# Patient Record
Sex: MALE | Race: WHITE | NOT HISPANIC OR LATINO | Employment: OTHER | URBAN - METROPOLITAN AREA
[De-identification: names, ages, dates, MRNs, and addresses within clinical notes are randomized per-mention and may not be internally consistent; named-entity substitution may affect disease eponyms.]

---

## 2017-01-13 NOTE — TELEPHONE ENCOUNTER
----- Message from Amelia Velasquez sent at 1/13/2017  2:58 PM CST -----  Contact: Amelia, Pharmacy Patient Assistance  Didier Song,    I met with Mr. Hernandez's wife today and she wanted me to reach out to you for a refill on his Gabapentin 100mg.  Please forward to Dr. Mcconnell to advise.      Thanks,    Amelia

## 2017-01-13 NOTE — TELEPHONE ENCOUNTER
Pt was given gabapentin from Sherman Akhtar NP on 1/29/16. Pt wife is requesting a refill of it for pt, please advise LOV: 5/31/16

## 2017-01-14 RX ORDER — GABAPENTIN 300 MG/1
300 CAPSULE ORAL 3 TIMES DAILY
Qty: 270 CAPSULE | Refills: 3 | Status: SHIPPED | OUTPATIENT
Start: 2017-01-14 | End: 2017-06-01 | Stop reason: SDUPTHER

## 2017-01-27 ENCOUNTER — TELEPHONE (OUTPATIENT)
Dept: FAMILY MEDICINE | Facility: CLINIC | Age: 77
End: 2017-01-27

## 2017-01-27 NOTE — TELEPHONE ENCOUNTER
----- Message from Adilia Camp sent at 1/26/2017  2:04 PM CST -----  Contact: Vee;s wife  Patient was too weak to get in the car so they are unable to be here today. Please call Shamika with advice on how to get him in to see you. Call Shamika at [phone].

## 2017-02-08 ENCOUNTER — OFFICE VISIT (OUTPATIENT)
Dept: FAMILY MEDICINE | Facility: CLINIC | Age: 77
End: 2017-02-08
Payer: MEDICARE

## 2017-02-08 VITALS
HEART RATE: 84 BPM | HEIGHT: 73 IN | DIASTOLIC BLOOD PRESSURE: 66 MMHG | RESPIRATION RATE: 18 BRPM | SYSTOLIC BLOOD PRESSURE: 94 MMHG | OXYGEN SATURATION: 98 %

## 2017-02-08 DIAGNOSIS — B35.6 TINEA CRURIS: ICD-10-CM

## 2017-02-08 DIAGNOSIS — I69.359 CVA, OLD, HEMIPARESIS: Primary | ICD-10-CM

## 2017-02-08 PROCEDURE — 99213 OFFICE O/P EST LOW 20 MIN: CPT | Mod: PBBFAC,PO,25 | Performed by: FAMILY MEDICINE

## 2017-02-08 PROCEDURE — 99999 PR PBB SHADOW E&M-EST. PATIENT-LVL III: CPT | Mod: PBBFAC,,, | Performed by: FAMILY MEDICINE

## 2017-02-08 PROCEDURE — 90670 PCV13 VACCINE IM: CPT | Mod: PBBFAC,PO | Performed by: FAMILY MEDICINE

## 2017-02-08 PROCEDURE — 99214 OFFICE O/P EST MOD 30 MIN: CPT | Mod: S$PBB,,, | Performed by: FAMILY MEDICINE

## 2017-02-08 RX ORDER — CLOTRIMAZOLE AND BETAMETHASONE DIPROPIONATE 10; .5 MG/ML; MG/ML
LOTION TOPICAL 2 TIMES DAILY
Qty: 30 ML | Refills: 0 | Status: SHIPPED | OUTPATIENT
Start: 2017-02-08 | End: 2017-06-01

## 2017-02-08 NOTE — MR AVS SNAPSHOT
[facility]  [address]  [place] [address]  Phone: [phone]  Fax: [phone]                  Richie Hernandez   2017 3:00 PM   Office Visit    Description:  Male : 1940   Provider:  Henry Mcconnell MD   Department:  [facility]           Reason for Visit     Annual Exam           Diagnoses this Visit        Comments    CVA, old, hemiparesis    -  Primary     Tinea cruris                To Do List           Future Appointments        Provider Department Dept Phone    2017 1:45 PM Henry Mcconnell MD [facility] [phone]      Goals (5 Years of Data)     None       These Medications        Disp Refills Start End    clotrimazole-betamethasone (LOTRISONE) lotion 30 mL 0 2017     Apply topically 2 (two) times daily. - Topical (Top)    Pharmacy: [facility] - [place] - [address]  Ph #: [phone]         [facility]     [facility] -  Assistance  Registered nurses in the [facility] provide clinical advisement, health education, appointment booking, and other advisory services.  Call for this free service at [phone].             Medications           Message regarding Medications     Verify the changes and/or additions to your medication regime listed below are the same as discussed with your clinician today.  If any of these changes or additions are incorrect, please notify your healthcare provider.        START taking these NEW medications        Refills    clotrimazole-betamethasone (LOTRISONE) lotion 0    Sig: Apply topically 2 (two) times daily.    Class: Normal    Route: Topical (Top)      STOP taking these medications     atorvastatin (LIPITOR) 40 MG tablet Take 1 tablet (40 mg total) by mouth once daily.    bisacodyl (DULCOLAX) 5 mg EC tablet Take 1 tablet (5 mg total) by mouth daily as needed for Constipation.    docusate sodium (COLACE) 100 MG  "capsule Take 1 capsule (100 mg total) by mouth once daily.    miconazole NITRATE 2 % (MICOTIN) 2 % top powder Apply topically 2 (two) times daily.    pantoprazole (PROTONIX) 40 MG tablet Take 1 tablet (40 mg total) by mouth once daily.    polyethylene glycol (GLYCOLAX) 17 gram PwPk Take 17 g by mouth once daily.           Verify that the below list of medications is an accurate representation of the medications you are currently taking.  If none reported, the list may be blank. If incorrect, please contact your healthcare provider. Carry this list with you in case of emergency.           Current Medications     aspirin 81 MG Chew Take 1 tablet (81 mg total) by mouth once daily.    citalopram (CELEXA) 20 MG tablet Take 1 tablet (20 mg total) by mouth once daily.    gabapentin (NEURONTIN) 300 MG capsule Take 1 capsule (300 mg total) by mouth 3 (three) times daily.    levothyroxine (SYNTHROID) 200 MCG tablet Take 1 tablet (200 mcg total) by mouth once daily.    metoprolol tartrate (LOPRESSOR) 25 MG tablet Take 1 tablet (25 mg total) by mouth once daily.    rivaroxaban (XARELTO) 20 mg Tab Take 1 tablet (20 mg total) by mouth daily with dinner or evening meal.    clotrimazole-betamethasone (LOTRISONE) lotion Apply topically 2 (two) times daily.           Clinical Reference Information           Your Vitals Were     BP Pulse Resp Height SpO2       94/66 84 18 6' 1" (1.854 m) 98%       Blood Pressure          Most Recent Value    BP  94/66      Allergies as of 2/8/2017     No Known Allergies      Immunizations Administered on Date of Encounter - 2/8/2017     Name Date Dose VIS Date Route    Pneumococcal Conjugate - 13 Valent  Incomplete 0.5 mL 11/5/2015 Intramuscular      Orders Placed During Today's Visit      Normal Orders This Visit    Ambulatory referral to Home Health     Pneumococcal Conjugate Vaccine (13 Valent) (IM)       Smoking Cessation     If you would like to quit smoking:   You may be eligible for free " services if you are a [place] resident and started smoking cigarettes before September 1, 1988.  Call the [employer]oking [employer]essation [employer] (SCT) toll free at (105) 879-4152 or (685) 707-3526.   Call 1-800-QUIT-NOW if you do not meet the above criteria.            Language Assistance Services     ATTENTION: Language assistance services are available, free of charge. Please call [phone].      ATENCIÓN: Si habla aliviaañol, tiene a godinez disposición servicios gratuitos de asistencia lingüística. Llame al [phone].     CHÚ Ý: N?u b?n nói Ti?ng Vi?t, có các d?ch v? h? tr? ngôn ng? mi?n phí dành cho b?n. G?i s? [phone].         [facility] complies with applicable Federal civil rights laws and does not discriminate on the basis of race, color, national origin, age, disability, or sex.

## 2017-02-13 NOTE — TELEPHONE ENCOUNTER
----- Message from Amelia Velasquez sent at 2/13/2017  9:27 AM CST -----  Contact: Amelia, Pharmacy Patient Assistance  Hi,    I have been assisting pt with Xarelto and he has been approved to receive his medicine at no cost to him, however he is in need of a recent prescription to be sent to his local pharmacy for processing.  Will you have Dr. Mcconnell send Xarelto prescription to [facility] (pt's local pharmacy)?    Thanks,    Amelia ext [phone]

## 2017-02-14 NOTE — PROGRESS NOTES
Subjective:       Patient ID: Richie Hernandez is a 77 y.o. male.    Chief Complaint: Annual Exam (home health recertification)    HPI   Since our last visit, the patient has experienced a significant MCA stroke.    He is continuing to receive care for his right sided hemiparesis with expressive aphasia via home health.  He is in need of a face to face visit in order to continue this service.  He has had continued, slow improvement of these issues    Review of Systems    Objective:      Physical Exam   Constitutional: He appears well-developed and well-nourished.   HENT:   Head: Normocephalic and atraumatic.   Eyes: EOM are normal. Pupils are equal, round, and reactive to light.   Neck: Neck supple.   Cardiovascular: Normal rate, regular rhythm and normal heart sounds.  Exam reveals no gallop and no friction rub.    No murmur heard.  Pulmonary/Chest: Effort normal and breath sounds normal. He has no wheezes. He has no rales.   Neurological: He is alert. He exhibits abnormal muscle tone. Coordination (right sided hemiparesis) abnormal.   Skin: Rash (underlying the patient's right axilla and breast) noted.   Vitals reviewed.      Assessment:       1. CVA, old, hemiparesis    2. Tinea cruris        Plan:       CVA, old, hemiparesis  -     Ambulatory referral to Home Health  - Continue current therapy  - Serial blood pressure monitoring  - Diet and exercise education.  - Continue OT/PT/ST    Tinea cruris  -     clotrimazole-betamethasone (LOTRISONE) lotion; Apply topically 2 (two) times daily.  Dispense: 30 mL; Refill: 0    Other orders  -     Pneumococcal Conjugate Vaccine (13 Valent) (IM)

## 2017-03-20 ENCOUNTER — PATIENT MESSAGE (OUTPATIENT)
Dept: FAMILY MEDICINE | Facility: CLINIC | Age: 77
End: 2017-03-20

## 2017-03-22 RX ORDER — CITALOPRAM 40 MG/1
40 TABLET, FILM COATED ORAL DAILY
Qty: 30 TABLET | Refills: 11 | Status: SHIPPED | OUTPATIENT
Start: 2017-03-22 | End: 2017-06-01 | Stop reason: SDUPTHER

## 2017-06-01 ENCOUNTER — TELEPHONE (OUTPATIENT)
Dept: FAMILY MEDICINE | Facility: CLINIC | Age: 77
End: 2017-06-01

## 2017-06-01 ENCOUNTER — TELEPHONE (OUTPATIENT)
Dept: PHARMACY | Facility: CLINIC | Age: 77
End: 2017-06-01

## 2017-06-01 ENCOUNTER — OFFICE VISIT (OUTPATIENT)
Dept: FAMILY MEDICINE | Facility: CLINIC | Age: 77
End: 2017-06-01
Payer: MEDICARE

## 2017-06-01 ENCOUNTER — OUTPATIENT CASE MANAGEMENT (OUTPATIENT)
Dept: ADMINISTRATIVE | Facility: OTHER | Age: 77
End: 2017-06-01

## 2017-06-01 VITALS
BODY MASS INDEX: 32.02 KG/M2 | DIASTOLIC BLOOD PRESSURE: 60 MMHG | HEIGHT: 73 IN | WEIGHT: 241.63 LBS | SYSTOLIC BLOOD PRESSURE: 90 MMHG | HEART RATE: 68 BPM

## 2017-06-01 DIAGNOSIS — I48.91 ATRIAL FIBRILLATION WITH RVR: Primary | ICD-10-CM

## 2017-06-01 DIAGNOSIS — G81.91 RIGHT HEMIPARESIS: ICD-10-CM

## 2017-06-01 DIAGNOSIS — F32.A DEPRESSION, UNSPECIFIED DEPRESSION TYPE: ICD-10-CM

## 2017-06-01 PROCEDURE — 99213 OFFICE O/P EST LOW 20 MIN: CPT | Mod: S$PBB,,, | Performed by: NURSE PRACTITIONER

## 2017-06-01 PROCEDURE — 99999 PR PBB SHADOW E&M-EST. PATIENT-LVL III: CPT | Mod: PBBFAC,,, | Performed by: NURSE PRACTITIONER

## 2017-06-01 PROCEDURE — 99213 OFFICE O/P EST LOW 20 MIN: CPT | Mod: PBBFAC,PO | Performed by: NURSE PRACTITIONER

## 2017-06-01 RX ORDER — METOPROLOL TARTRATE 25 MG/1
25 TABLET, FILM COATED ORAL DAILY
Qty: 90 TABLET | Refills: 3 | Status: SHIPPED | OUTPATIENT
Start: 2017-06-01 | End: 2018-06-01

## 2017-06-01 RX ORDER — CITALOPRAM 40 MG/1
40 TABLET, FILM COATED ORAL DAILY
Qty: 90 TABLET | Refills: 3 | Status: SHIPPED | OUTPATIENT
Start: 2017-06-01 | End: 2018-07-31 | Stop reason: SDUPTHER

## 2017-06-01 RX ORDER — GABAPENTIN 300 MG/1
300 CAPSULE ORAL 3 TIMES DAILY
Qty: 270 CAPSULE | Refills: 3 | Status: SHIPPED | OUTPATIENT
Start: 2017-06-01 | End: 2018-06-01

## 2017-06-01 NOTE — TELEPHONE ENCOUNTER
----- Message from Maris Waldron sent at 6/1/2017  1:56 PM CDT -----  For your Information:    Please note the following patient has been assigned to DANI Whitlock with Outpatient Complex Care Mgmt for screening.    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    Thank you,  Maris Waldron,SSC

## 2017-06-01 NOTE — TELEPHONE ENCOUNTER
----- Message from DANI Ko sent at 6/1/2017  2:35 PM CDT -----  This CSW received a referral on the above patient. CSW completed assessment with patient spouse. Spouse advised this CSW that Amelia with Pharmacy Assistance  contacted her to provide assistance with medication affordability. Patient spouse is also requesting a PT evaluation so that patient can get out of his motor home with ease. Thi Tejada NP if you are in agreement with patient spouse request  please place orders.     Thank you for the referral,    DANI Whitlock

## 2017-06-01 NOTE — TELEPHONE ENCOUNTER
Pt wife requested re enrollment application for Synthroid. Mailed application to pt's home. Pending returned completed application.

## 2017-06-01 NOTE — PROGRESS NOTES
This CSW received a referral on the above patient.   Reason for referral:Atrial Fibrillation  Name of the community resource that was provided:[employer] Directive   Resource given to: Patient spouse via US Mail    CSW completed assessment with patient spouse. Spouse advised this CSW that Amelia with Pharmacy Assistance  contacted her to provide assistance with medication affordability. Patient spouse is also requesting a PT evaluation so that patient can get out of his motor home with ease. CSW sent an e-mail to Thi Tejada NP if she is an agreement with patient spouse request to please place orders.

## 2017-06-01 NOTE — PROGRESS NOTES
For your Information:    Please note the following patient has been assigned to DANI Whitlock with Outpatient Complex Care Mgmt for screening.    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    Thank you,  Maris Waldron,SSC

## 2017-06-05 NOTE — PROGRESS NOTES
Subjective:       Patient ID: Richie Hernandez is a 77 y.o. male.    Chief Complaint: Stroke    Here today for follow up visit. Accompanied by his wife today.   Increased celexa 6 weeks ago. Doing well.   Follow up Stroke/PT. Would like PT to be continued       Review of Systems   Constitutional: Positive for activity change and fatigue. Negative for appetite change and fever.   Respiratory: Negative for cough, chest tightness, shortness of breath and wheezing.    Cardiovascular: Negative for chest pain, palpitations and leg swelling.   Skin: Negative for rash.   Neurological: Positive for weakness. Negative for dizziness.       Objective:      Physical Exam   Constitutional: He is oriented to person, place, and time. He appears well-nourished.   Cardiovascular: Normal rate, regular rhythm, normal heart sounds and intact distal pulses.    Pulmonary/Chest: Effort normal and breath sounds normal. He has no wheezes. He has no rales.   Abdominal: Soft. Bowel sounds are normal. There is no tenderness.   Neurological: He is alert and oriented to person, place, and time.   Skin: Skin is warm and dry. No rash noted.   Vitals reviewed.      Assessment:       1. Atrial fibrillation with RVR    2. 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 Aphasia complicating stroke    4. Right hemiparesis    5. Depression, unspecified depression type        Plan:       Richie was seen today for stroke.    Diagnoses and all orders for this visit:    Atrial fibrillation with RVR  -     SUBSEQUENT HOME HEALTH ORDERS  -     Ambulatory referral to Outpatient Case Management  -     rivaroxaban (XARELTO) 20 mg Tab; Take 1 tablet (20 mg total) by mouth daily with dinner or evening meal.  -     metoprolol tartrate (LOPRESSOR) 25 MG tablet; Take 1 tablet (25 mg total) by mouth once daily.  -     gabapentin (NEURONTIN) 300 MG capsule; Take 1 capsule (300 mg total) by mouth 3 (three) times daily.    Dysarthria due to cerebrovascular accident  (CVA)  -     SUBSEQUENT HOME HEALTH ORDERS  -     Ambulatory referral to Outpatient Case Management  -     rivaroxaban (XARELTO) 20 mg Tab; Take 1 tablet (20 mg total) by mouth daily with dinner or evening meal.  -     metoprolol tartrate (LOPRESSOR) 25 MG tablet; Take 1 tablet (25 mg total) by mouth once daily.  -     gabapentin (NEURONTIN) 300 MG capsule; Take 1 capsule (300 mg total) by mouth 3 (three) times daily.    Aphasia complicating stroke  -     SUBSEQUENT HOME HEALTH ORDERS  -     Ambulatory referral to Outpatient Case Management  -     rivaroxaban (XARELTO) 20 mg Tab; Take 1 tablet (20 mg total) by mouth daily with dinner or evening meal.  -     metoprolol tartrate (LOPRESSOR) 25 MG tablet; Take 1 tablet (25 mg total) by mouth once daily.  -     gabapentin (NEURONTIN) 300 MG capsule; Take 1 capsule (300 mg total) by mouth 3 (three) times daily.    Right hemiparesis  -     SUBSEQUENT HOME HEALTH ORDERS  -     Ambulatory referral to Outpatient Case Management  -     rivaroxaban (XARELTO) 20 mg Tab; Take 1 tablet (20 mg total) by mouth daily with dinner or evening meal.  -     metoprolol tartrate (LOPRESSOR) 25 MG tablet; Take 1 tablet (25 mg total) by mouth once daily.  -     gabapentin (NEURONTIN) 300 MG capsule; Take 1 capsule (300 mg total) by mouth 3 (three) times daily.    Depression, unspecified depression type  -     Ambulatory referral to Outpatient Case Management  -     citalopram (CELEXA) 40 MG tablet; Take 1 tablet (40 mg total) by mouth once daily.

## 2017-06-22 ENCOUNTER — TELEPHONE (OUTPATIENT)
Dept: FAMILY MEDICINE | Facility: CLINIC | Age: 77
End: 2017-06-22

## 2017-06-22 NOTE — TELEPHONE ENCOUNTER
----- Message from Rose Frias sent at 6/22/2017  1:19 PM CDT -----  Contact: Wife Shamika [phone]  She is calling to check the status of the paperwork home health sent to you on Tuesday.  Please call her.  Thank you!

## 2017-06-22 NOTE — TELEPHONE ENCOUNTER
Called pt wife Shamika, she stated husbands PT faxed over approval for inpatient PT to pcp. Advised we had not received anything I will call [facility] to verify fax number. Called [facility] spoke with Falguni, she looked up information and she stated she did see the request from the PT on 6/20/17 to Dr. Mcconnell. She asked if I would like her to refax it, I requested her to do so. She will be faxing to my attention.     Called pt wife back, notified her of what had happened and I am getting it re faxed to us and she verbally understood.    Pt wife would like a call when form faxed.   Please advise, paper work in box for signature.

## 2017-06-23 ENCOUNTER — TELEPHONE (OUTPATIENT)
Dept: PHARMACY | Facility: CLINIC | Age: 77
End: 2017-06-23

## 2017-06-23 RX ORDER — LEVOTHYROXINE SODIUM 200 UG/1
200 TABLET ORAL DAILY
Qty: 30 TABLET | Refills: 9 | Status: SHIPPED | OUTPATIENT
Start: 2017-06-23 | End: 2017-09-10 | Stop reason: ALTCHOICE

## 2017-06-23 NOTE — TELEPHONE ENCOUNTER
----- Message from Amelia Velasquez sent at 6/23/2017  9:49 AM CDT -----  Contact: Amelia, Pharmacy Patient Assistance  Hello,    Pt is currently getting his Synthroid through [facility], but there has been a delay in shipment. Pt is requesting an Rx be sent to local [facility] pharmacy to purchase some until his shipment arrives. Please notate pt would only like brand name.       Thanks for your help.

## 2017-06-29 ENCOUNTER — TELEPHONE (OUTPATIENT)
Dept: FAMILY MEDICINE | Facility: CLINIC | Age: 77
End: 2017-06-29

## 2017-06-29 NOTE — TELEPHONE ENCOUNTER
----- Message from Kenna Del Toro sent at 6/29/2017 11:08 AM CDT -----  Contact: wife  Would like to know why he didn't feel patient would qualify for in-patient physical therapy.  Please call back at [phone] (home)

## 2017-06-29 NOTE — TELEPHONE ENCOUNTER
Called [facility], spoke with Falguni, she looked up notes and it stated that per Dr. Florez he stated pt was too low level and did not feel he qualified for in patient rehab. Verbally understood and advised will call wife and explain.    Called pt wife Shamika, notified and she verbally understood. She stated she will call Lizy at [facility] in patient rehab.

## 2017-08-21 DIAGNOSIS — Z12.11 COLON CANCER SCREENING: ICD-10-CM

## 2017-09-08 ENCOUNTER — LAB VISIT (OUTPATIENT)
Dept: LAB | Facility: HOSPITAL | Age: 77
End: 2017-09-08
Attending: FAMILY MEDICINE
Payer: MEDICARE

## 2017-09-08 ENCOUNTER — OFFICE VISIT (OUTPATIENT)
Dept: FAMILY MEDICINE | Facility: CLINIC | Age: 77
End: 2017-09-08
Payer: MEDICARE

## 2017-09-08 VITALS
WEIGHT: 240.94 LBS | SYSTOLIC BLOOD PRESSURE: 92 MMHG | HEART RATE: 50 BPM | RESPIRATION RATE: 20 BRPM | BODY MASS INDEX: 31.93 KG/M2 | DIASTOLIC BLOOD PRESSURE: 64 MMHG | HEIGHT: 73 IN

## 2017-09-08 DIAGNOSIS — E03.9 HYPOTHYROIDISM, UNSPECIFIED TYPE: ICD-10-CM

## 2017-09-08 DIAGNOSIS — I48.20 CHRONIC ATRIAL FIBRILLATION: ICD-10-CM

## 2017-09-08 DIAGNOSIS — I48.20 CHRONIC ATRIAL FIBRILLATION: Primary | ICD-10-CM

## 2017-09-08 LAB
ALBUMIN SERPL BCP-MCNC: 3.2 G/DL
ALP SERPL-CCNC: 69 U/L
ALT SERPL W/O P-5'-P-CCNC: 11 U/L
ANION GAP SERPL CALC-SCNC: 10 MMOL/L
AST SERPL-CCNC: 9 U/L
BILIRUB SERPL-MCNC: 0.7 MG/DL
BUN SERPL-MCNC: 16 MG/DL
CALCIUM SERPL-MCNC: 8.9 MG/DL
CHLORIDE SERPL-SCNC: 103 MMOL/L
CHOLEST SERPL-MCNC: 143 MG/DL
CHOLEST/HDLC SERPL: 4.3 {RATIO}
CO2 SERPL-SCNC: 24 MMOL/L
CREAT SERPL-MCNC: 1.3 MG/DL
EST. GFR  (AFRICAN AMERICAN): >60 ML/MIN/1.73 M^2
EST. GFR  (NON AFRICAN AMERICAN): 53.4 ML/MIN/1.73 M^2
GLUCOSE SERPL-MCNC: 84 MG/DL
HDLC SERPL-MCNC: 33 MG/DL
HDLC SERPL: 23.1 %
LDLC SERPL CALC-MCNC: 88 MG/DL
NONHDLC SERPL-MCNC: 110 MG/DL
POTASSIUM SERPL-SCNC: 4.3 MMOL/L
PROT SERPL-MCNC: 7.3 G/DL
SODIUM SERPL-SCNC: 137 MMOL/L
T4 FREE SERPL-MCNC: 1.69 NG/DL
TRIGL SERPL-MCNC: 110 MG/DL
TSH SERPL DL<=0.005 MIU/L-ACNC: 0.04 UIU/ML

## 2017-09-08 PROCEDURE — 80061 LIPID PANEL: CPT

## 2017-09-08 PROCEDURE — 1159F MED LIST DOCD IN RCRD: CPT | Mod: ,,, | Performed by: FAMILY MEDICINE

## 2017-09-08 PROCEDURE — 99999 PR PBB SHADOW E&M-EST. PATIENT-LVL III: CPT | Mod: PBBFAC,,, | Performed by: FAMILY MEDICINE

## 2017-09-08 PROCEDURE — 84439 ASSAY OF FREE THYROXINE: CPT

## 2017-09-08 PROCEDURE — 36415 COLL VENOUS BLD VENIPUNCTURE: CPT | Mod: PO

## 2017-09-08 PROCEDURE — 1126F AMNT PAIN NOTED NONE PRSNT: CPT | Mod: ,,, | Performed by: FAMILY MEDICINE

## 2017-09-08 PROCEDURE — 80053 COMPREHEN METABOLIC PANEL: CPT

## 2017-09-08 PROCEDURE — 84443 ASSAY THYROID STIM HORMONE: CPT

## 2017-09-08 PROCEDURE — 99214 OFFICE O/P EST MOD 30 MIN: CPT | Mod: S$PBB,,, | Performed by: FAMILY MEDICINE

## 2017-09-08 PROCEDURE — 99213 OFFICE O/P EST LOW 20 MIN: CPT | Mod: PBBFAC,PO | Performed by: FAMILY MEDICINE

## 2017-09-10 ENCOUNTER — PATIENT MESSAGE (OUTPATIENT)
Dept: FAMILY MEDICINE | Facility: CLINIC | Age: 77
End: 2017-09-10

## 2017-09-10 DIAGNOSIS — E03.9 HYPOTHYROIDISM, UNSPECIFIED TYPE: Primary | ICD-10-CM

## 2017-09-10 RX ORDER — LEVOTHYROXINE SODIUM 175 UG/1
175 TABLET ORAL DAILY
Qty: 30 TABLET | Refills: 11 | Status: SHIPPED | OUTPATIENT
Start: 2017-09-10 | End: 2018-01-01 | Stop reason: SDUPTHER

## 2017-09-11 NOTE — TELEPHONE ENCOUNTER
Please see decrease dosage of synthroid for patient. States that you are helping get the medication. Spouse notified and stated that she will call back to schedule blood work.

## 2017-09-12 NOTE — PROGRESS NOTES
Subjective:       Patient ID: Richie Hernandez is a 77 y.o. male    Chief Complaint: Hypothyroidism (follow up; hm due: tetanus, colonoscopy, flu)    HPI  Here today for interval evaluation  Continuing recovery from his CVA.  Has movement in his shoulder on the left and in that same side hip which is an improvement from previous.  He continues with aphasia  He is adjusted well mentally with no anxiety or depression.    Review of Systems      Objective:   Physical Exam   Constitutional: He is oriented to person, place, and time. He appears well-developed and well-nourished.   HENT:   Head: Normocephalic and atraumatic.   Eyes: EOM are normal. Pupils are equal, round, and reactive to light.   Neck: Neck supple.   Cardiovascular: Normal rate, regular rhythm and normal heart sounds.  Exam reveals no gallop and no friction rub.    No murmur heard.  Pulmonary/Chest: Effort normal and breath sounds normal. He has no wheezes. He has no rales.   Neurological: He is alert and oriented to person, place, and time.   Vitals reviewed.        Assessment:       1. Chronic atrial fibrillation  Comprehensive metabolic panel   2. 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 Hypothyroidism, unspecified type  TSH         Plan:       Chronic atrial fibrillation  -     Comprehensive metabolic panel; Future; Expected date: 09/08/2017  - Continue current therapy  - Serial blood pressure monitoring  - Diet and exercise education.  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  Patient no longer candidate for continued inpatient rehab    Hypothyroidism, unspecified type  -     TSH; Future; Expected date: 09/08/2017  - Continue current therapy

## 2017-11-27 ENCOUNTER — TELEPHONE (OUTPATIENT)
Dept: FAMILY MEDICINE | Facility: CLINIC | Age: 77
End: 2017-11-27

## 2017-11-27 NOTE — TELEPHONE ENCOUNTER
----- Message from Ana Londono sent at 11/27/2017 10:47 AM CST -----  Contact: Patient's wife, Shamika  Patient's wife is calling to see if someone from home health could come out because patient is due to have some lab work done and also needs to have a flu shot.  Call Back#[phone]  Thanks

## 2017-11-28 NOTE — TELEPHONE ENCOUNTER
----- Message from Danielle Martinez sent at 11/28/2017 12:28 PM CST -----  Wife (Shamika) is calling nurse (Conchis)back concerning yesterday's message/please call back at [phone].

## 2017-11-29 NOTE — TELEPHONE ENCOUNTER
Received incoming call from patient's wife. States patient had a stroke a year ago and it is very difficult to get him out of the house. Wife is wondering if patient could get HH in order to get labs and flu shot. Please advise. Thanks.

## 2017-12-05 ENCOUNTER — PATIENT MESSAGE (OUTPATIENT)
Dept: FAMILY MEDICINE | Facility: CLINIC | Age: 77
End: 2017-12-05

## 2017-12-07 ENCOUNTER — TELEPHONE (OUTPATIENT)
Dept: FAMILY MEDICINE | Facility: CLINIC | Age: 77
End: 2017-12-07

## 2017-12-07 DIAGNOSIS — M62.81 MUSCLE WEAKNESS: Primary | ICD-10-CM

## 2017-12-07 NOTE — TELEPHONE ENCOUNTER
----- Message from Adilson Silva sent at 12/7/2017  9:44 AM CST -----  Contact: wife Shamika   Wife Shamika want to speak with a nurse regarding lab work and flu shot. She called Home Health and they told her patient can go for another round of physical therapy. Please call back at [phone] (home)

## 2017-12-07 NOTE — TELEPHONE ENCOUNTER
Spoke to patient's wife and states she called [facility] and they told her that they could come out and do PT again, if  would order the PT for muscle weakness. They could come out and do lab work (TSH) and flu shot as well, if  will give them this order. Please advise. Thanks.

## 2017-12-12 ENCOUNTER — TELEPHONE (OUTPATIENT)
Dept: FAMILY MEDICINE | Facility: CLINIC | Age: 77
End: 2017-12-12

## 2017-12-12 DIAGNOSIS — I50.9 CONGESTIVE HEART FAILURE, UNSPECIFIED CONGESTIVE HEART FAILURE CHRONICITY, UNSPECIFIED CONGESTIVE HEART FAILURE TYPE: Primary | ICD-10-CM

## 2017-12-12 NOTE — TELEPHONE ENCOUNTER
----- Message from Christopher James sent at 12/12/2017  8:10 AM CST -----  Contact: Precious/[facility]  Precious called in regarding the attached patient.  Precious stated Dr. Mcconnell faxed over orders for PT & OT but it says Outpatient.  Precious stated that if Home Health is supposed to do it then the orders need to read Inpatient.  Precious's fax number is [phone] and call [phone]

## 2017-12-12 NOTE — TELEPHONE ENCOUNTER
----- Message from Glory Weaver sent at 12/12/2017  8:53 AM CST -----  Contact: Shamika Hernandez  Wife called regarding requesting physical therapy for the patient and received a call from the company this morning. Need at home physical therapy, cannot go out the house. Please contact [phone] (home)

## 2017-12-12 NOTE — TELEPHONE ENCOUNTER
Spoke to patient's wife and stated to her that the order was corrected and the PT will be done through [facility]. Verbalized understanding.

## 2017-12-14 NOTE — TELEPHONE ENCOUNTER
Spoke to Precious with [facility]  and states they cannot do the flu shot and lab work in the home. Pt is receiving PT only, and states that since this is not a skill, they cannot send a nurse out for that.     Spoke to patient's wife and stated to her what was noted. Wife states that she does not like getting patient out in the cold weather and that it is hard getting him out of the motor home. Asking how else patient can have labs done? States he is almost out of synthroid. Please advise. Thanks.

## 2017-12-14 NOTE — TELEPHONE ENCOUNTER
Spoke to Jocelin and wanted to see if it would be okay to have a nurse come out and help patient with his medications? Please advise. Thanks.

## 2017-12-14 NOTE — TELEPHONE ENCOUNTER
----- Message from Rosalie Pierre sent at 12/14/2017  8:55 AM CST -----  Contact: Precious with [facility]  Precious with [facility] regarding a order for a flu shot and labs. Patient has home health for therapy only. She does not have a nurse at the home. Please call Precious at [phone]. Thanks!

## 2017-12-14 NOTE — TELEPHONE ENCOUNTER
----- Message from Dory Ramesh sent at 12/14/2017  3:13 PM CST -----  Contact: [facility] is needing a call back to discuss patient   Please call back [phone]

## 2017-12-15 RX ORDER — LEVOTHYROXINE SODIUM 175 UG/1
175 TABLET ORAL DAILY
Qty: 30 TABLET | Refills: 11 | Status: CANCELLED | OUTPATIENT
Start: 2017-12-15 | End: 2018-12-15

## 2017-12-15 NOTE — TELEPHONE ENCOUNTER
Spoke to Jocelin with HH and states patient is needing education with wife about mediation and making sure it is given at correct times. Just needing this for patient's wife mainly in giving proper education. Jocelin states it is going to be very difficult to get patient into clinic, as he is a total assist patient. Please advise. Thanks.

## 2017-12-15 NOTE — TELEPHONE ENCOUNTER
Cannot have home health unless they also have appt in the next 30 days, medicare rules.  Can get it now if they schedule appt for the next 30 days

## 2017-12-15 NOTE — TELEPHONE ENCOUNTER
Spoke to patient's wife and stated to her what was noted. Wife states she does not have any help to get patient out of the motor home. States it has 6 steps that is very hard for patient to go down. Would prefer for HH to be able to go over the medications. Wife also states patient needs the TSH lab drawn as well as a flu shot. And would like all this done by the HH nurse. Please advise. Thanks.

## 2017-12-15 NOTE — TELEPHONE ENCOUNTER
Spoke to patient's wife and stated to her what was noted. Asking for HH order to be placed and will call and schedule a date for patient to be seen within 30 days.     Pt's wife was asking about Synthroid refill but this was refilled for 1 year in September.

## 2017-12-18 NOTE — TELEPHONE ENCOUNTER
Spoke to patient's wife and stated to her what was noted. States she will call back after she looks at her calender to schedule an appointment. Referral faxed to [facility].

## 2017-12-19 ENCOUNTER — TELEPHONE (OUTPATIENT)
Dept: FAMILY MEDICINE | Facility: CLINIC | Age: 77
End: 2017-12-19

## 2017-12-19 NOTE — TELEPHONE ENCOUNTER
Received incoming call from Kenna with [facility]. States she went to see patient yesterday for evaluation. Has to have an appointment within the next 2 weeks to meet the requirement for face to face.     Called patient's wife and made her aware of this. States that Kenna told her yesterday that patient had 30 days and is requesting a call back from Kenna.    Called Kenna and states she will call patient's wife.

## 2017-12-19 NOTE — TELEPHONE ENCOUNTER
Attempted to contact Kenna with [facility]. Unable to leave message due to voicemail being full. Will try again later.

## 2017-12-19 NOTE — TELEPHONE ENCOUNTER
----- Message from Fer Vega sent at 12/19/2017  9:13 AM CST -----  Contact: Jennifer- [facility]  She states he has to have a visit within the next  2 weeks or he will be released from home health for no face to face. We are not sure if nurse practitioner will be ok. She also needs to know what labs need to be drawn to make sure he is up to date. Please call her [phone] thanks

## 2017-12-20 ENCOUNTER — TELEPHONE (OUTPATIENT)
Dept: FAMILY MEDICINE | Facility: CLINIC | Age: 77
End: 2017-12-20

## 2017-12-20 NOTE — TELEPHONE ENCOUNTER
----- Message from Fer Vega sent at 12/20/2017 10:50 AM CST -----  Contact: Shamika Wife  Calling to have an appointment before Jan 12 for Medicare to continue to pay for home health from stroke . Not sure if it ok with Medicare to see NP for this visit. She said she does not have a problem. Please call back [phone] (home)   Thanks!

## 2017-12-26 ENCOUNTER — LAB VISIT (OUTPATIENT)
Dept: LAB | Facility: HOSPITAL | Age: 77
End: 2017-12-26
Attending: FAMILY MEDICINE
Payer: MEDICARE

## 2017-12-26 DIAGNOSIS — M62.81 MUSCLE WEAKNESS (GENERALIZED): Primary | ICD-10-CM

## 2017-12-26 LAB
T4 FREE SERPL-MCNC: 1.36 NG/DL
TSH SERPL DL<=0.005 MIU/L-ACNC: 0.03 UIU/ML

## 2017-12-26 PROCEDURE — 84443 ASSAY THYROID STIM HORMONE: CPT

## 2017-12-26 PROCEDURE — 84439 ASSAY OF FREE THYROXINE: CPT

## 2018-01-01 ENCOUNTER — PATIENT MESSAGE (OUTPATIENT)
Dept: FAMILY MEDICINE | Facility: CLINIC | Age: 78
End: 2018-01-01

## 2018-01-01 DIAGNOSIS — E03.9 HYPOTHYROIDISM, UNSPECIFIED TYPE: Primary | ICD-10-CM

## 2018-01-01 RX ORDER — LEVOTHYROXINE SODIUM 150 UG/1
150 TABLET ORAL DAILY
Qty: 30 TABLET | Refills: 11 | Status: SHIPPED | OUTPATIENT
Start: 2018-01-01 | End: 2018-07-02 | Stop reason: SDUPTHER

## 2018-01-02 NOTE — TELEPHONE ENCOUNTER
Spoke to wife and notified of decreasing Synthroid.patient will see Romina this Thursday so we will scheduled him for his TSH then

## 2018-01-04 ENCOUNTER — OFFICE VISIT (OUTPATIENT)
Dept: FAMILY MEDICINE | Facility: CLINIC | Age: 78
End: 2018-01-04
Payer: MEDICARE

## 2018-01-04 VITALS
BODY MASS INDEX: 31.93 KG/M2 | HEART RATE: 46 BPM | HEIGHT: 73 IN | SYSTOLIC BLOOD PRESSURE: 110 MMHG | WEIGHT: 240.94 LBS | RESPIRATION RATE: 18 BRPM | OXYGEN SATURATION: 97 % | DIASTOLIC BLOOD PRESSURE: 60 MMHG

## 2018-01-04 DIAGNOSIS — M62.40 ABNORMAL MUSCLE CONTRACTION, LOCALIZED: Primary | ICD-10-CM

## 2018-01-04 DIAGNOSIS — G81.91 RIGHT HEMIPARESIS: ICD-10-CM

## 2018-01-04 DIAGNOSIS — I48.20 CHRONIC ATRIAL FIBRILLATION: ICD-10-CM

## 2018-01-04 PROCEDURE — 99213 OFFICE O/P EST LOW 20 MIN: CPT | Mod: PBBFAC,PO,25 | Performed by: PHYSICIAN ASSISTANT

## 2018-01-04 PROCEDURE — 90662 IIV NO PRSV INCREASED AG IM: CPT | Mod: PBBFAC,PO

## 2018-01-04 PROCEDURE — 99214 OFFICE O/P EST MOD 30 MIN: CPT | Mod: S$PBB,,, | Performed by: PHYSICIAN ASSISTANT

## 2018-01-04 PROCEDURE — 99999 PR PBB SHADOW E&M-EST. PATIENT-LVL III: CPT | Mod: PBBFAC,,, | Performed by: PHYSICIAN ASSISTANT
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
## 2018-01-05 ENCOUNTER — TELEPHONE (OUTPATIENT)
Dept: FAMILY MEDICINE | Facility: CLINIC | Age: 78
End: 2018-01-05

## 2018-01-05 NOTE — TELEPHONE ENCOUNTER
----- Message from Mari So sent at 1/4/2018  4:44 PM CST -----  Contact: Precious from [facility]  Calling to state received an order for occupational therapy for custom wrist splint but the therapist stated needs to go to outpatient for custom fit. Also was discharged from occupational therapy. Thanks!

## 2018-01-08 ENCOUNTER — TELEPHONE (OUTPATIENT)
Dept: FAMILY MEDICINE | Facility: CLINIC | Age: 78
End: 2018-01-08

## 2018-01-08 NOTE — TELEPHONE ENCOUNTER
Spoke to Jocelin with [facility]  and wanted to make sure if was okay with  that patient has therapy 2x weekly for 3 weeks.

## 2018-01-08 NOTE — TELEPHONE ENCOUNTER
----- Message from Danielle Lucas sent at 1/8/2018  1:14 PM CST -----  Kristal with Physical Therapy [facility] called to inform they will be sentd patient to physical therapy 2 x per week for 3 weeks, contact her at [phone].    Thank you

## 2018-01-11 ENCOUNTER — TELEPHONE (OUTPATIENT)
Dept: FAMILY MEDICINE | Facility: CLINIC | Age: 78
End: 2018-01-11

## 2018-01-11 NOTE — TELEPHONE ENCOUNTER
Pt came to clinic to get a custom fit wrist brace but ortho does not do that here. Please advise where pt should go to get a custom fit wrist brace.

## 2018-01-11 NOTE — TELEPHONE ENCOUNTER
"Spoke with DME.   Please enter order for "custom wrist brace HME", print, sign the order, and fax.   "

## 2018-01-25 ENCOUNTER — TELEPHONE (OUTPATIENT)
Dept: FAMILY MEDICINE | Facility: CLINIC | Age: 78
End: 2018-01-25

## 2018-01-25 NOTE — TELEPHONE ENCOUNTER
KRYSTAL- Spoke to Jocelin with [facility]. Jocelin states that pt has some sore on his hip and states she is going to order a nurse to go out to look at it.

## 2018-01-25 NOTE — TELEPHONE ENCOUNTER
----- Message from Ana Londono sent at 1/25/2018 11:50 AM CST -----  Contact: Jocelin with [facility]  The patient is having some breakdown on his left hip and she was going to order a nurse evaluation for it.    Call Back#[phone]  Thanks
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
## 2018-01-26 NOTE — TELEPHONE ENCOUNTER
----- Message from Glory Medel sent at 1/26/2018 10:15 AM CST -----  [facility], Kenna, states patient has skin tears on Rt & Lt side of legs, she is going to [place], please call [phone]

## 2018-02-01 PROBLEM — R13.10 DYSPHAGIA: Status: ACTIVE | Noted: 2018-02-01

## 2018-02-01 PROBLEM — G96.08 SUBDURAL HYGROMA: Status: ACTIVE | Noted: 2018-02-01

## 2018-02-01 PROBLEM — R13.19 OTHER DYSPHAGIA: Status: ACTIVE | Noted: 2018-02-01

## 2018-02-01 PROBLEM — S06.5XAA SUBDURAL HEMATOMA: Status: ACTIVE | Noted: 2018-02-01

## 2018-02-02 PROBLEM — R93.0 ABNORMAL HEAD CT: Status: ACTIVE | Noted: 2018-02-02

## 2018-02-03 PROBLEM — G03.9 MENINGITIS: Status: ACTIVE | Noted: 2018-02-03

## 2018-02-03 PROBLEM — R79.1 SUPRATHERAPEUTIC INR: Status: ACTIVE | Noted: 2018-02-03

## 2018-02-05 PROBLEM — M48.02 CERVICAL SPINAL STENOSIS: Status: ACTIVE | Noted: 2018-02-05

## 2018-02-06 ENCOUNTER — TELEPHONE (OUTPATIENT)
Dept: GASTROENTEROLOGY | Facility: CLINIC | Age: 78
End: 2018-02-06

## 2018-02-06 NOTE — TELEPHONE ENCOUNTER
----- Message from Mari Burnette sent at 2/6/2018  8:34 AM CST -----  Contact: Krystal Maher [phone] with [facility] is calling to clarify an order for today/please call as soon as possible/patient is an inpatient at the hospital now

## 2018-02-08 ENCOUNTER — TELEPHONE (OUTPATIENT)
Dept: GASTROENTEROLOGY | Facility: CLINIC | Age: 78
End: 2018-02-08

## 2018-02-16 ENCOUNTER — TELEPHONE (OUTPATIENT)
Dept: FAMILY MEDICINE | Facility: CLINIC | Age: 78
End: 2018-02-16

## 2018-02-16 NOTE — TELEPHONE ENCOUNTER
Need to know reason/dx why patient seeing Dr. Clay.   [facility]

## 2018-02-16 NOTE — TELEPHONE ENCOUNTER
----- Message from Juan Hines sent at 2/15/2018  4:48 PM CST -----  Contact: Oral Mixon with [facility] called requesting referral for patient to see . Call back at [phone]. Thanks,

## 2018-02-20 NOTE — TELEPHONE ENCOUNTER
Attempted to contact Oral at [facility]. Lady that answered phone states Oral is not in, that she is at an appointment. Will try again later.

## 2018-02-27 ENCOUNTER — HOSPITAL ENCOUNTER (EMERGENCY)
Facility: HOSPITAL | Age: 78
End: 2018-02-27
Attending: EMERGENCY MEDICINE
Payer: MEDICARE

## 2018-02-27 VITALS
SYSTOLIC BLOOD PRESSURE: 96 MMHG | WEIGHT: 240 LBS | RESPIRATION RATE: 16 BRPM | HEART RATE: 46 BPM | DIASTOLIC BLOOD PRESSURE: 50 MMHG | TEMPERATURE: 97 F | OXYGEN SATURATION: 94 %

## 2018-02-27 DIAGNOSIS — R00.1 SINUS BRADYCARDIA: ICD-10-CM

## 2018-02-27 DIAGNOSIS — Z00.8 ENCOUNTER FOR PSYCHOLOGICAL EVALUATION: Primary | ICD-10-CM

## 2018-02-27 PROCEDURE — 93010 ELECTROCARDIOGRAM REPORT: CPT | Mod: ,,, | Performed by: INTERNAL MEDICINE

## 2018-02-27 PROCEDURE — 93005 ELECTROCARDIOGRAM TRACING: CPT

## 2018-02-27 PROCEDURE — 99284 EMERGENCY DEPT VISIT MOD MDM: CPT

## 2018-02-27 RX ORDER — OMEPRAZOLE 20 MG/1
20 CAPSULE, DELAYED RELEASE ORAL DAILY
COMMUNITY

## 2018-02-27 RX ORDER — PRAVASTATIN SODIUM 80 MG/1
80 TABLET ORAL DAILY
COMMUNITY

## 2018-02-27 RX ORDER — CITALOPRAM 40 MG/1
40 TABLET, FILM COATED ORAL DAILY
COMMUNITY
End: 2018-06-29 | Stop reason: SDUPTHER

## 2018-02-27 RX ORDER — GABAPENTIN 300 MG/1
300 CAPSULE ORAL 3 TIMES DAILY
COMMUNITY
End: 2018-09-05 | Stop reason: SDUPTHER

## 2018-02-27 RX ORDER — LEVOTHYROXINE SODIUM 150 UG/1
150 TABLET ORAL DAILY
COMMUNITY
End: 2018-06-29 | Stop reason: SDUPTHER

## 2018-02-27 RX ORDER — METOPROLOL TARTRATE 25 MG/1
25 TABLET, FILM COATED ORAL 2 TIMES DAILY
COMMUNITY
End: 2018-08-07 | Stop reason: SDUPTHER
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
## 2018-06-22 RX ORDER — RIVAROXABAN 20 MG/1
TABLET, FILM COATED ORAL
Qty: 30 TABLET | Refills: 11 | OUTPATIENT
Start: 2018-06-22

## 2018-06-25 ENCOUNTER — TELEPHONE (OUTPATIENT)
Dept: FAMILY MEDICINE | Facility: CLINIC | Age: 78
End: 2018-06-25

## 2018-06-25 NOTE — TELEPHONE ENCOUNTER
Attempted to contact pt wife. Left message on voicemail letting pt wife know that refill was sent to pharmacy.

## 2018-06-25 NOTE — TELEPHONE ENCOUNTER
----- Message from Dory Ramesh sent at 6/25/2018  9:20 AM CDT -----  Contact: Wife Shamika   Call placed to POD     Wife states she was talking to the  on Friday and she needs to speak  with her again about patient   Please call back [phone]

## 2018-06-25 NOTE — TELEPHONE ENCOUNTER
----- Message from Milnea Rodríguez sent at 6/25/2018  1:02 PM CDT -----  Pt's wife [phone]  Wants a refill Xarelto / asking to speak to nurse asap

## 2018-06-29 ENCOUNTER — LAB VISIT (OUTPATIENT)
Dept: LAB | Facility: HOSPITAL | Age: 78
End: 2018-06-29
Attending: FAMILY MEDICINE
Payer: MEDICARE

## 2018-06-29 ENCOUNTER — OFFICE VISIT (OUTPATIENT)
Dept: FAMILY MEDICINE | Facility: CLINIC | Age: 78
End: 2018-06-29
Payer: MEDICARE

## 2018-06-29 VITALS
BODY MASS INDEX: 33.08 KG/M2 | WEIGHT: 249.56 LBS | SYSTOLIC BLOOD PRESSURE: 106 MMHG | DIASTOLIC BLOOD PRESSURE: 62 MMHG | RESPIRATION RATE: 16 BRPM | HEART RATE: 56 BPM | HEIGHT: 73 IN

## 2018-06-29 DIAGNOSIS — L98.9 SKIN LESION: ICD-10-CM

## 2018-06-29 DIAGNOSIS — E03.9 ACQUIRED HYPOTHYROIDISM: ICD-10-CM

## 2018-06-29 DIAGNOSIS — E03.9 ACQUIRED HYPOTHYROIDISM: Primary | ICD-10-CM

## 2018-06-29 LAB
T4 FREE SERPL-MCNC: 1.4 NG/DL
TSH SERPL DL<=0.005 MIU/L-ACNC: 0.19 UIU/ML

## 2018-06-29 PROCEDURE — 99213 OFFICE O/P EST LOW 20 MIN: CPT | Mod: PBBFAC,PO,25 | Performed by: FAMILY MEDICINE

## 2018-06-29 PROCEDURE — 99213 OFFICE O/P EST LOW 20 MIN: CPT | Mod: S$PBB,,, | Performed by: FAMILY MEDICINE

## 2018-06-29 PROCEDURE — 84443 ASSAY THYROID STIM HORMONE: CPT

## 2018-06-29 PROCEDURE — 36415 COLL VENOUS BLD VENIPUNCTURE: CPT | Mod: PO

## 2018-06-29 PROCEDURE — 84439 ASSAY OF FREE THYROXINE: CPT

## 2018-06-29 PROCEDURE — G0009 ADMIN PNEUMOCOCCAL VACCINE: HCPCS | Mod: PBBFAC,PO

## 2018-06-29 PROCEDURE — 99999 PR PBB SHADOW E&M-EST. PATIENT-LVL III: CPT | Mod: PBBFAC,,, | Performed by: FAMILY MEDICINE

## 2018-07-02 ENCOUNTER — TELEPHONE (OUTPATIENT)
Dept: FAMILY MEDICINE | Facility: CLINIC | Age: 78
End: 2018-07-02

## 2018-07-02 DIAGNOSIS — E03.9 HYPOTHYROIDISM, UNSPECIFIED TYPE: Primary | ICD-10-CM

## 2018-07-02 RX ORDER — LEVOTHYROXINE SODIUM 125 UG/1
125 TABLET ORAL DAILY
Qty: 30 TABLET | Refills: 11 | Status: SHIPPED | OUTPATIENT
Start: 2018-07-02 | End: 2019-07-02

## 2018-07-02 NOTE — TELEPHONE ENCOUNTER
----- Message from Jemal Aragon sent at 7/2/2018  1:54 PM CDT -----  Contact: wendy pt's spouse  She's calling in regards to a missed call, [phone] (home)

## 2018-07-05 ENCOUNTER — TELEPHONE (OUTPATIENT)
Dept: FAMILY MEDICINE | Facility: CLINIC | Age: 78
End: 2018-07-05

## 2018-07-05 NOTE — TELEPHONE ENCOUNTER
----- Message from Ismael Martinez sent at 7/5/2018  2:53 PM CDT -----  Contact: Pt wife Shamika  Pt was retuning call for test results. Please call back and advise. Pt requests for results to be left on voicemail if possible.   Call Back#[phone]  Thanks
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
## 2018-07-31 DIAGNOSIS — F32.A DEPRESSION, UNSPECIFIED DEPRESSION TYPE: ICD-10-CM

## 2018-08-01 RX ORDER — CITALOPRAM 40 MG/1
TABLET, FILM COATED ORAL
Qty: 90 TABLET | Refills: 0 | Status: SHIPPED | OUTPATIENT
Start: 2018-08-01

## 2018-08-07 RX ORDER — METOPROLOL TARTRATE 25 MG/1
TABLET, FILM COATED ORAL
Qty: 90 TABLET | Refills: 0 | Status: SHIPPED | OUTPATIENT
Start: 2018-08-07

## 2018-09-05 RX ORDER — GABAPENTIN 300 MG/1
300 CAPSULE ORAL 3 TIMES DAILY
Qty: 270 CAPSULE | Refills: 3 | Status: SHIPPED | OUTPATIENT
Start: 2018-09-05

## 2018-09-05 NOTE — TELEPHONE ENCOUNTER
----- Message from Cecily Cui sent at 9/5/2018  1:20 PM CDT -----  Type:  RX Refill Request    Who Called:  Wife - Shamika Refill or New Rx:  Refill   RX Name and Strength: Gabapentin 300 mg   How is the patient currently taking it? (ex. 1XDay):  3 x day  Is this a 30 day or 90 day RX:  90 day supply  Preferred Pharmacy with phone number:  [facility] on file  Local or Mail Order:  local  Ordering Provider:  Dr Damian Gallardo Call Back Number:  [phone]  Additional Information:

## 2018-09-05 NOTE — TELEPHONE ENCOUNTER
Patient called requesting refill on medication to preferred pharmacy. Last Office Visit (date: 6/29/18)  Next Office Visit (date: 10/31/18) . Please advise.

## 2018-10-24 DIAGNOSIS — I69.359 CVA, OLD, HEMIPARESIS: Primary | ICD-10-CM

## 2023-04-11 NOTE — TELEPHONE ENCOUNTER
----- Message from Christopher James sent at 6/22/2018  1:53 PM CDT -----  Contact: Wife/Shamika  Shamika called in regarding the attached patient ().  Shamika stated that patient is having difficulty getting patients Rx for rivaroxaban (XARELTO) 20 mg Tab.  Shamika stated patient has been getting this from Dr. Mcconnell since last February.      [facility] [address] [place] [address] - [address]  [address]  [place] [address]  Phone: [phone] Fax: [phone]    Patient call back number is [phone]   no